# Patient Record
Sex: MALE | Race: WHITE | NOT HISPANIC OR LATINO | URBAN - METROPOLITAN AREA
[De-identification: names, ages, dates, MRNs, and addresses within clinical notes are randomized per-mention and may not be internally consistent; named-entity substitution may affect disease eponyms.]

---

## 2023-05-02 ENCOUNTER — TELEPHONE (OUTPATIENT)
Dept: PRIMARY CARE | Facility: CLINIC | Age: 28
End: 2023-05-02
Payer: COMMERCIAL

## 2023-05-02 NOTE — TELEPHONE ENCOUNTER
Pt called to schedule as a new patient but doesn't get off of work till 3:30. I schedule him for 5/15 @ 4. I just want to make sure it was ok with you.

## 2023-05-15 ENCOUNTER — OFFICE VISIT (OUTPATIENT)
Dept: PRIMARY CARE | Facility: CLINIC | Age: 28
End: 2023-05-15
Payer: COMMERCIAL

## 2023-05-15 VITALS
HEART RATE: 107 BPM | OXYGEN SATURATION: 93 % | DIASTOLIC BLOOD PRESSURE: 78 MMHG | SYSTOLIC BLOOD PRESSURE: 126 MMHG | TEMPERATURE: 97.8 F | HEIGHT: 74 IN | BODY MASS INDEX: 34.08 KG/M2 | WEIGHT: 265.6 LBS

## 2023-05-15 DIAGNOSIS — Z13.220 LIPID SCREENING: ICD-10-CM

## 2023-05-15 DIAGNOSIS — R10.11 POSTPRANDIAL RUQ PAIN: Primary | ICD-10-CM

## 2023-05-15 LAB
ALANINE AMINOTRANSFERASE (SGPT) (U/L) IN SER/PLAS: 34 U/L (ref 10–52)
ALBUMIN (G/DL) IN SER/PLAS: 4.5 G/DL (ref 3.4–5)
ALKALINE PHOSPHATASE (U/L) IN SER/PLAS: 68 U/L (ref 33–120)
ANION GAP IN SER/PLAS: 13 MMOL/L (ref 10–20)
ASPARTATE AMINOTRANSFERASE (SGOT) (U/L) IN SER/PLAS: 17 U/L (ref 9–39)
BILIRUBIN TOTAL (MG/DL) IN SER/PLAS: 0.5 MG/DL (ref 0–1.2)
CALCIUM (MG/DL) IN SER/PLAS: 9.9 MG/DL (ref 8.6–10.6)
CARBON DIOXIDE, TOTAL (MMOL/L) IN SER/PLAS: 27 MMOL/L (ref 21–32)
CHLORIDE (MMOL/L) IN SER/PLAS: 107 MMOL/L (ref 98–107)
CHOLESTEROL (MG/DL) IN SER/PLAS: 113 MG/DL (ref 0–199)
CHOLESTEROL IN HDL (MG/DL) IN SER/PLAS: 29 MG/DL
CHOLESTEROL/HDL RATIO: 3.9
CREATININE (MG/DL) IN SER/PLAS: 1.19 MG/DL (ref 0.5–1.3)
ERYTHROCYTE DISTRIBUTION WIDTH (RATIO) BY AUTOMATED COUNT: 12.2 % (ref 11.5–14.5)
ERYTHROCYTE MEAN CORPUSCULAR HEMOGLOBIN CONCENTRATION (G/DL) BY AUTOMATED: 32.9 G/DL (ref 32–36)
ERYTHROCYTE MEAN CORPUSCULAR VOLUME (FL) BY AUTOMATED COUNT: 84 FL (ref 80–100)
ERYTHROCYTES (10*6/UL) IN BLOOD BY AUTOMATED COUNT: 5.72 X10E12/L (ref 4.5–5.9)
GFR MALE: 84 ML/MIN/1.73M2
GLUCOSE (MG/DL) IN SER/PLAS: 87 MG/DL (ref 74–99)
HEMATOCRIT (%) IN BLOOD BY AUTOMATED COUNT: 48 % (ref 41–52)
HEMOGLOBIN (G/DL) IN BLOOD: 15.8 G/DL (ref 13.5–17.5)
LDL: 54 MG/DL (ref 0–99)
LEUKOCYTES (10*3/UL) IN BLOOD BY AUTOMATED COUNT: 8.4 X10E9/L (ref 4.4–11.3)
NRBC (PER 100 WBCS) BY AUTOMATED COUNT: 0 /100 WBC (ref 0–0)
PLATELETS (10*3/UL) IN BLOOD AUTOMATED COUNT: 320 X10E9/L (ref 150–450)
POTASSIUM (MMOL/L) IN SER/PLAS: 4.4 MMOL/L (ref 3.5–5.3)
PROTEIN TOTAL: 7.1 G/DL (ref 6.4–8.2)
SODIUM (MMOL/L) IN SER/PLAS: 143 MMOL/L (ref 136–145)
TRIGLYCERIDE (MG/DL) IN SER/PLAS: 152 MG/DL (ref 0–149)
UREA NITROGEN (MG/DL) IN SER/PLAS: 18 MG/DL (ref 6–23)
VLDL: 30 MG/DL (ref 0–40)

## 2023-05-15 PROCEDURE — 99204 OFFICE O/P NEW MOD 45 MIN: CPT | Performed by: FAMILY MEDICINE

## 2023-05-15 PROCEDURE — 80061 LIPID PANEL: CPT

## 2023-05-15 PROCEDURE — 1036F TOBACCO NON-USER: CPT | Performed by: FAMILY MEDICINE

## 2023-05-15 PROCEDURE — 85027 COMPLETE CBC AUTOMATED: CPT

## 2023-05-15 PROCEDURE — 80053 COMPREHEN METABOLIC PANEL: CPT

## 2023-05-15 SDOH — HEALTH STABILITY: PHYSICAL HEALTH: ON AVERAGE, HOW MANY MINUTES DO YOU ENGAGE IN EXERCISE AT THIS LEVEL?: 0 MIN

## 2023-05-15 SDOH — HEALTH STABILITY: PHYSICAL HEALTH: ON AVERAGE, HOW MANY DAYS PER WEEK DO YOU ENGAGE IN MODERATE TO STRENUOUS EXERCISE (LIKE A BRISK WALK)?: 0 DAYS

## 2023-05-15 ASSESSMENT — PAIN SCALES - GENERAL: PAINLEVEL: 0-NO PAIN

## 2023-05-15 NOTE — PROGRESS NOTES
"Subjective   Patient ID: Blake Sofia is a 27 y.o. male who presents for New Patient Visit (Establish care with pcp) and Abdominal Pain (Ongoing right upper abdominal rib cage discomfort and pressure. ).    HPI Grew up in [facility] and then here in [place].    Occupation: R&D technician.  . 2 children.  \PMHx: Fx arm x 2. Fell off swing.     Review of Systems  Preswsure feeling in RUQ below breast.  Comes and goes.  Usuallyafter a meal. Especially if eats sweets.  No N/V.  X1 month    Objective   /78 (BP Location: Left arm, Patient Position: Sitting, BP Cuff Size: Adult)   Pulse 107   Temp 36.6 °C (97.8 °F) (Temporal)   Ht 1.88 m (6' 2\")   Wt 120 kg (265 lb 9.6 oz)   SpO2 93%   BMI 34.10 kg/m²     Physical Exam  Alert, pleasant and in no acute distress.  Neck: Supple.  No adenopathy or thyroid enlargement/nodule  Heart: Regular rate and rhythm without murmur  Lungs: Clear to auscultation  Abdomen: Soft.  Mild tenderness in the right upper quadrant.  No guarding or rebound.  No palpable mass.  Lower extremities: No edema  Assessment/Plan   Problem List Items Addressed This Visit    None  Visit Diagnoses       Postprandial RUQ pain    -  Primary    Relevant Orders    US gallbladder    CBC    Comprehensive Metabolic Panel    Lipid screening        Relevant Orders    Lipid Panel          Patient with right upper quadrant discomfort.  We discussed various possible diagnoses.  We will get right upper quadrant ultrasound.  Patient to monitor various things that help lower her his symptoms.  Routine labs obtained including CBC, CMP and lipid panel.  We will call with results as they come in.     "

## 2023-05-24 ENCOUNTER — TELEPHONE (OUTPATIENT)
Dept: PRIMARY CARE | Facility: CLINIC | Age: 28
End: 2023-05-24
Payer: COMMERCIAL

## 2023-05-24 NOTE — TELEPHONE ENCOUNTER
----- Message from Leonides Cobb DO sent at 5/20/2023  6:21 PM EDT -----  Please let patient know that his blood work was good.  The sugar, kidney, and liver tests were all normal.  He had a normal blood count and a nice low cholesterol at 113.  If we are going to be picky, I would comment that his good cholesterol is a little bit low.  Regular exercise and a healthier diet can help build this up over time.  Recommend recheck the blood in 1 year.  We will await the ultrasound tests.  ----- Message -----  From: Lab, Background User  Sent: 5/15/2023  10:29 PM EDT  To: Leonides Cobb DO

## 2025-04-28 ENCOUNTER — ANCILLARY PROCEDURE (OUTPATIENT)
Dept: URGENT CARE | Age: 30
End: 2025-04-28
Payer: COMMERCIAL

## 2025-04-28 ENCOUNTER — OFFICE VISIT (OUTPATIENT)
Dept: URGENT CARE | Age: 30
End: 2025-04-28
Payer: COMMERCIAL

## 2025-04-28 VITALS
HEIGHT: 73 IN | SYSTOLIC BLOOD PRESSURE: 137 MMHG | DIASTOLIC BLOOD PRESSURE: 80 MMHG | WEIGHT: 263 LBS | BODY MASS INDEX: 34.85 KG/M2 | HEART RATE: 78 BPM | OXYGEN SATURATION: 97 % | RESPIRATION RATE: 18 BRPM | TEMPERATURE: 98 F

## 2025-04-28 DIAGNOSIS — M79.671 PAIN IN RIGHT FOOT: ICD-10-CM

## 2025-04-28 DIAGNOSIS — M77.51 TENDINITIS OF RIGHT FOOT: Primary | ICD-10-CM

## 2025-04-28 PROCEDURE — L3260 AMBULATORY SURGICAL BOOT EAC: HCPCS | Performed by: NURSE PRACTITIONER

## 2025-04-28 PROCEDURE — 99203 OFFICE O/P NEW LOW 30 MIN: CPT | Performed by: NURSE PRACTITIONER

## 2025-04-28 PROCEDURE — 73630 X-RAY EXAM OF FOOT: CPT | Mod: RIGHT SIDE | Performed by: NURSE PRACTITIONER

## 2025-04-28 PROCEDURE — 3008F BODY MASS INDEX DOCD: CPT | Performed by: NURSE PRACTITIONER

## 2025-04-28 PROCEDURE — 1036F TOBACCO NON-USER: CPT | Performed by: NURSE PRACTITIONER

## 2025-04-28 RX ORDER — PREDNISONE 20 MG/1
40 TABLET ORAL DAILY
Qty: 10 TABLET | Refills: 0 | Status: SHIPPED | OUTPATIENT
Start: 2025-04-28 | End: 2025-05-03

## 2025-04-28 ASSESSMENT — PATIENT HEALTH QUESTIONNAIRE - PHQ9
2. FEELING DOWN, DEPRESSED OR HOPELESS: NOT AT ALL
1. LITTLE INTEREST OR PLEASURE IN DOING THINGS: NOT AT ALL
SUM OF ALL RESPONSES TO PHQ9 QUESTIONS 1 AND 2: 0

## 2025-04-28 ASSESSMENT — ENCOUNTER SYMPTOMS: ARTHRALGIAS: 1

## 2025-04-28 ASSESSMENT — PAIN SCALES - GENERAL: PAINLEVEL_OUTOF10: 6

## 2025-04-28 NOTE — PROGRESS NOTES
"Subjective   Patient ID: Blake Sofia is a 29 y.o. male. They present today with a chief complaint of Foot Pain (Right foot pain since last night, no injury).    History of Present Illness  HPI    Presents to urgent care for complaints of right foot pain starting yesterday.  Patient denies any known injury.  He states he took ibuprofen yesterday which did help.  Past Medical History  Allergies as of 04/28/2025    (No Known Allergies)       Prescriptions Prior to Admission[1]     Medical History[2]    Surgical History[3]     reports that he has never smoked. He has never used smokeless tobacco. He reports that he does not drink alcohol and does not use drugs.    Review of Systems  Review of Systems   Musculoskeletal:  Positive for arthralgias.                                  Objective    Vitals:    04/28/25 0830   BP: 137/80   BP Location: Left arm   Pulse: 78   Resp: 18   Temp: 36.7 °C (98 °F)   SpO2: 97%   Weight: 119 kg (263 lb)   Height: 1.854 m (6' 1\")     No LMP for male patient.    Physical Exam  Vitals reviewed.   Constitutional:       Appearance: Normal appearance.   Musculoskeletal:      Right foot: Decreased range of motion (decreased flexion and extension). Normal capillary refill. Swelling and tenderness (dorsal foot) present. No bony tenderness. Normal pulse.   Neurological:      Mental Status: He is alert.         Procedures    Point of Care Test & Imaging Results from this visit  No results found for this visit on 04/28/25.   Imaging  No results found.    Cardiology, Vascular, and Other Imaging  No other imaging results found for the past 2 days      Diagnostic study results (if any) were reviewed by DANIELA Lind.    Assessment/Plan   Allergies, medications, history, and pertinent labs/EKGs/Imaging reviewed by DANIELA Lind.     Medical Decision     === 04/28/25 ===    XR FOOT 3+ VIEWS RIGHT    - Impression -  1. Unremarkable right foot " radiographs.    MACRO:  None.    Signed by: Rossy Burnham 4/28/2025 9:04 AM  Dictation workstation:   [id number]     Will start patient on prednisone for tendinitis of right foot.  Patient was placed in a postop shoe and referral placed for patient to follow-up with podiatry.  He was told to try to keep foot elevated when resting.  You may use Tylenol or ibuprofen as needed for pain as well.    At time of discharge patient was clinically well-appearing and HDS for outpatient management. The patient and/or family was educated regarding diagnosis, supportive care, OTC and Rx medications. The patient and/or family was given the opportunity to ask questions prior to discharge.  They verbalized understanding of my discussion of the plans for treatment, expected course, indications to return to  or seek further evaluation in ED, and the need for timely follow up as directed.   They were provided with a work/school excuse if requested.      Orders and Diagnoses  Diagnoses and all orders for this visit:  Tendinitis of right foot  -     predniSONE (Deltasone) 20 mg tablet; Take 2 tablets (40 mg) by mouth once daily for 5 days.  -     Referral to Podiatry; Future  Pain in right foot  -     XR foot right 3+ views; Future  -     Referral to Podiatry; Future      Medical Admin Record      Patient disposition: Home    Electronically signed by DANIELA Lind  8:36 AM           [1] (Not in a hospital admission)  [2] No past medical history on file.  [3] No past surgical history on file.

## 2025-04-28 NOTE — LETTER
April 28, 2025     Patient: Blake Sofia   YOB: 1995   Date of Visit: 4/28/2025       To Whom It May Concern:    It is my medical opinion that Blake Sofia may return to work on 5/1/25 .    If you have any questions or concerns, please don't hesitate to call.         Sincerely,        Anne Burt, CHELSEA-CNP    CC: No Recipients